# Patient Record
Sex: FEMALE | Race: WHITE | NOT HISPANIC OR LATINO | URBAN - NONMETROPOLITAN AREA
[De-identification: names, ages, dates, MRNs, and addresses within clinical notes are randomized per-mention and may not be internally consistent; named-entity substitution may affect disease eponyms.]

---

## 2017-06-09 ENCOUNTER — APPOINTMENT (RX ONLY)
Dept: URBAN - NONMETROPOLITAN AREA CLINIC 10 | Facility: CLINIC | Age: 67
Setting detail: DERMATOLOGY
End: 2017-06-09

## 2017-06-09 DIAGNOSIS — D22 MELANOCYTIC NEVI: ICD-10-CM

## 2017-06-09 DIAGNOSIS — B02.9 ZOSTER WITHOUT COMPLICATIONS: ICD-10-CM

## 2017-06-09 PROBLEM — D22.5 MELANOCYTIC NEVI OF TRUNK: Status: ACTIVE | Noted: 2017-06-09

## 2017-06-09 PROBLEM — L08.9 LOCAL INFECTION OF THE SKIN AND SUBCUTANEOUS TISSUE, UNSPECIFIED: Status: ACTIVE | Noted: 2017-06-09

## 2017-06-09 PROBLEM — I10 ESSENTIAL (PRIMARY) HYPERTENSION: Status: ACTIVE | Noted: 2017-06-09

## 2017-06-09 PROCEDURE — 99202 OFFICE O/P NEW SF 15 MIN: CPT

## 2017-06-09 PROCEDURE — ? COUNSELING

## 2017-06-09 PROCEDURE — ? TREATMENT REGIMEN

## 2017-06-09 ASSESSMENT — LOCATION DETAILED DESCRIPTION DERM
LOCATION DETAILED: RIGHT INFERIOR LATERAL MIDBACK
LOCATION DETAILED: LEFT CLAVICULAR SKIN

## 2017-06-09 ASSESSMENT — LOCATION ZONE DERM: LOCATION ZONE: TRUNK

## 2017-06-09 ASSESSMENT — LOCATION SIMPLE DESCRIPTION DERM
LOCATION SIMPLE: LEFT CLAVICULAR SKIN
LOCATION SIMPLE: RIGHT LOWER BACK

## 2017-06-09 NOTE — PROCEDURE: TREATMENT REGIMEN
Plan: Keep watch--pt wishes to forgo oral therapy since localized and mildly symptomatic (desires topical symptom control only)
Otc Regimen: Non cortisone cream , sarna lotion as needed
Detail Level: Detailed